# Patient Record
Sex: FEMALE | Race: WHITE
[De-identification: names, ages, dates, MRNs, and addresses within clinical notes are randomized per-mention and may not be internally consistent; named-entity substitution may affect disease eponyms.]

---

## 2023-05-28 ENCOUNTER — HOSPITAL ENCOUNTER (EMERGENCY)
Dept: HOSPITAL 75 - ER FS | Age: 38
Discharge: HOME | End: 2023-05-28
Payer: MEDICAID

## 2023-05-28 VITALS — BODY MASS INDEX: 24.5 KG/M2 | HEIGHT: 65 IN | WEIGHT: 147.05 LBS

## 2023-05-28 VITALS — SYSTOLIC BLOOD PRESSURE: 143 MMHG | DIASTOLIC BLOOD PRESSURE: 86 MMHG
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
## 2023-05-28 DIAGNOSIS — S51.852A: Primary | ICD-10-CM

## 2023-05-28 PROCEDURE — 99283 EMERGENCY DEPT VISIT LOW MDM: CPT

## 2023-05-28 NOTE — ED INTEGUMENTARY GENERAL
General


Chief Complaint:  Bite-Animal/Human/Insect


Stated Complaint:  ANIMAL BITE| HAND NUMB


Source:  patient


Exam Limitations:  no limitations





History of Present Illness


Date Seen by Provider:  May 28, 2023


Time Seen by Provider:  23:20


Initial Comments


37-year-old female presents for cat bite to her left arm.  She states 3 days ago

she was seen for cat bite on her left arm, had a tetanus updated since taking 

Augmentin.  Today she was bitten by a cat once again in the same spot.  She did 

not really worry about it because she was already on antibiotics and that she 

started to have a little numbness in her thumb and index finger and it hurts to 

move her left thumb.  The bite was in the mid forearm on the radial surface.  No

fevers or chills.  No drainage.





All other systems reviewed and negative except documented per HPI.





Voice recognition software was used to help create this chart





Allergies and Home Medications


Patient Home Medication List


Home Medication List Reviewed:  Yes





Review of Systems


Review of Systems


Constitutional:  see HPI





Past Medical-Social-Family Hx


Patient Social History


Tobacco Use?:  No


Use of E-Cig and/or Vaping dev:  No


Substance use?:  No


Alcohol Use?:  No





Physical Exam


Vital Signs


Capillary Refill :


General Appearance:  WD/WN, no apparent distress


Cardiovascular:  regular rate, rhythm, no murmur


Respiratory:  chest non-tender, lungs clear, normal breath sounds, no 

respiratory distress, no accessory muscle use


Gastrointestinal:  non tender, soft


Extremities:  non-tender, normal inspection, other (Several abrasions and 

puncture type marks to the flexor and lateral radial surface of the forearm.  

Neurovascular motor and sensory intact.  No induration, drainage.)


Skin:  normal color, warm/dry, other (Abrasions, punctures as described above)





Departure


Communication (Admissions)


Patient is hemodynamically stable, nontoxic.  She is here for second cat bite.  

She is already on Augmentin.  No indication for new antibiotics.  There is no 

evidence for infection.  She likely had a bite directly over near the tendon and

the radial nerve causing inflammation in the area or small damage to the area.  

Overall there is no weakness to to the affected area.  She does have mild 

sensory deficits thumb and index finger on the left side motor is intact.  She 

has full range of motion with minimal pain.  She is discharged in stable 

condition with supportive care and recommended to continue her antibiotics.





Impression





   Primary Impression:  


   Cat bite


   Qualified Codes:  W55.01XA - Bitten by cat, initial encounter


Disposition:  01 HOME, SELF-CARE


Condition:  Stable





Departure-Patient Inst.





Add. Discharge Instructions:  


Continue your antibiotics as previously prescribed.  Use ibuprofen alternated 

with Tylenol as needed for pain, inflammation.  Return to the emergency 

department immediately if you have any streaking up your arm or redness, 

drainage looks like pus or if your symptoms change in any way concerning to you.

 I believe the numbness in your fingers will get somewhat better but it is hard 

to tell if it all completely resolved.  The pain with movement in your thumb 

should get better over time.  Follow-up with your primary doctor for any 

nonemergent needs.





All discharge instructions reviewed with patient and/or family. Voiced 

understanding.











CHANI AUGUSTINE DO            May 28, 2023 23:29

## 2023-06-25 ENCOUNTER — HOSPITAL ENCOUNTER (EMERGENCY)
Dept: HOSPITAL 75 - ER FS | Age: 38
Discharge: HOME | End: 2023-06-25
Payer: MEDICAID

## 2023-06-25 VITALS — HEIGHT: 65 IN | WEIGHT: 148.59 LBS | BODY MASS INDEX: 24.76 KG/M2

## 2023-06-25 VITALS — SYSTOLIC BLOOD PRESSURE: 139 MMHG | DIASTOLIC BLOOD PRESSURE: 88 MMHG

## 2023-06-25 DIAGNOSIS — G44.209: ICD-10-CM

## 2023-06-25 DIAGNOSIS — M54.12: Primary | ICD-10-CM

## 2023-06-25 PROCEDURE — 99284 EMERGENCY DEPT VISIT MOD MDM: CPT

## 2023-06-25 NOTE — ED HEADACHE
General


Chief Complaint:  Head/Cervical Problems


Stated Complaint:  NECK PAIN - BILAT HAND NUMBNESS


Source:  patient


Exam Limitations:  no limitations





History of Present Illness


Date Seen by Provider:  Jun 25, 2023


Time Seen by Provider:  00:56


Initial Comments


37-year-old female with past medical history of depression, chronic neck and dallin

k pain, headaches coming in due to headache and neck pain.  Started about a week

and a half ago, went to [facility] emergency department was diagnosed 

with a pinched nerve in her neck.  She also had a headache and was given a 

migraine cocktail which improved her symptoms.  Symptoms started back up tonight

with the tightness in her neck with tension going across the back of her head to

the front.  The pain is moderate, constant, pressure-like.  With the pinched 

nerve, she sometimes gets numbness to her left arm past the elbow which she was 

experiencing a little bit earlier tonight.  Denies any weakness, falls, does not

take any blood thinners, no vision changes, chest pain, or any other concerns.





Allergies and Home Medications


Allergies


Coded Allergies:  


     No Known Drug Allergies (Unverified , 6/25/23)





Patient Home Medication List


Home Medication List Reviewed:  Yes





Review of Systems


Review of Systems


Constitutional:  No fever


Eyes:  No Symptoms Reported


Ears, Nose, Mouth, Throat:  no symptoms reported


Respiratory:  no symptoms reported


Cardiovascular:  no symptoms reported


Gastrointestinal:  no symptoms reported


Genitourinary:  no symptoms reported


Musculoskeletal:  see HPI


Psychiatric/Neurological:  See HPI





Past Medical-Social-Family Hx


Patient Social History


Substance use?:  Yes


Substance type:  Marijuana





Immunizations Up To Date


First/Initial COVID19 Vaccinat:  2021


Second COVID19 Vaccination Martin:  2021


Third COVID19 Vaccination Date:  2022





Physical Exam


Vital Signs


Capillary Refill :


Height, Weight, BMI


Height: '"


Weight: lbs. oz. kg; 24.00 BMI


Method:


General Appearance:  WD/WN, no apparent distress


HEENT:  PERRL/EOMI, normal ENT inspection, pharynx normal


Neck:  non-tender, full range of motion, supple, normal inspection, other 

(Positive Spurling on the left with recreation of her symptoms, tingling in the 

distribution of C6 and C7)


Cardiovascular:  regular rate, rhythm


Respiratory:  chest non-tender, lungs clear, normal breath sounds, no 

respiratory distress, no accessory muscle use


Gastrointestinal:  normal bowel sounds, non tender, soft


Back:  normal inspection, no CVA tenderness, no vertebral tenderness


Extremities:  normal range of motion, non-tender, normal inspection, no pedal 

edema, no calf tenderness, normal capillary refill


Psychiatric:  alert


Crainal Nerves:  normal hearing, normal speech, PERRL


Coordination/Gait:  normal finger to nose, normal gait


Motor/Sensory:  no motor deficit, no sensory deficit


Skin:  normal color, warm/dry





Progress/Results/Core Measures


Results/Orders


My Orders





Orders - JULIANN LAMBERT MD


Ketorolac Injection (Toradol Injection) (6/25/23 01:30)


Prochlorperazine Injection (Compazine In (6/25/23 01:30)


Diphenhydramine Injection (Benadryl Inje (6/25/23 01:30)


Dexamethasone Oral Soln (Ed) (Decadron I (6/25/23 01:19)








Progress


Progress Note :  


Progress Note


37-year-old female with above history coming in due to headache and cervical 

radiculopathy.  ABCs were intact and vitals were stable on presentation.  

Physical exam with a positive Spurling and tingling in the C6 and C7 

distribution of her neck.  I suspect a tension type headache as well as cervical

radiculopathy clinically.  She has no red flags on exam.  She will be given IM 

medications for her headache as well as Decadron for the headache and cervical 

radiculopathy.  In the absence of trauma and the fact that this is going on a 

chronic issue,, I think it is appropriate for her to have outpatient imaging 

with a spine surgeon as a consultant, especially given she has no true numbness 

or weakness on exam objectively.  Overall well-appearing and I believe stable 

for discharge with outpatient follow-up.  She was sent home with strict return 

precautions.





Departure


Impression





   Primary Impression:  


   Cervical radiculopathy


   Additional Impression:  


   Tension headache


Disposition:  01 HOME, SELF-CARE


Condition:  Stable





Departure-Patient Inst.


Decision time for Depature:  01:35


Referrals:  


NO,LOCAL PHYSICIAN (PCP/Family)


Primary Care Physician


Patient Instructions:  Headache, Adult ED, Radiculopathy (DC)





Add. Discharge Instructions:  


The numbness in your arm could be from 2 different things.  We sometimes see 

this with migraines, and it typically gets better when the headache gets better.

 I think what is more likely is a pinched nerve in your cervical spine causing 

numbness down your arm.  This is typically more common when the numbness goes 

past the elbow.  The medications we gave you today would help with inflammation 

and could potentially help with this.  We do recommend if this persists to 

follow-up with your regular doctor and likely get a referral to a spine 

physician for an evaluation.  If having pain tomorrow, we recommend 600 mg of 

ibuprofen every 6 hours as well as 1000 mg of Tylenol every 6 hours for the 

pain.


Work/School Note:  Work Release Form   Date Seen in the Emergency Department:  

Jun 25, 2023


   Return to Work:  Jun 26, 2023


   Restrictions:  No Restrictions











JULIANN LAMBERT MD          Jun 25, 2023 01:19

## 2023-07-13 ENCOUNTER — HOSPITAL ENCOUNTER (EMERGENCY)
Dept: HOSPITAL 75 - ER FS | Age: 38
LOS: 1 days | Discharge: HOME | End: 2023-07-14
Payer: MEDICAID

## 2023-07-13 DIAGNOSIS — R23.2: Primary | ICD-10-CM

## 2023-07-13 DIAGNOSIS — T50.995A: ICD-10-CM

## 2023-07-13 DIAGNOSIS — T40.495A: ICD-10-CM

## 2023-07-13 DIAGNOSIS — T50.7X5A: ICD-10-CM

## 2023-07-13 DIAGNOSIS — T43.225A: ICD-10-CM

## 2023-07-13 DIAGNOSIS — T48.1X5A: ICD-10-CM

## 2023-07-13 DIAGNOSIS — L53.9: ICD-10-CM

## 2023-07-13 PROCEDURE — 99283 EMERGENCY DEPT VISIT LOW MDM: CPT

## 2023-07-13 NOTE — ED INTEGUMENTARY GENERAL
General


Stated Complaint:  FACIAL/CHEST RASH





History of Present Illness


Date Seen by Provider:  Jul 13, 2023


Time Seen by Provider:  23:55


Initial Comments


37 yr F with PMH of Depression, is here with c/o flushing and redness to her 

face and chest, and also to her hands which has been worsening over the past 

couple of days, with restlessness, anxiety, night sweats, hot flashes.  Patient 

started on Viibryd one moth ago. Pt is also on Subaxone, and was recently 

started on Flexeril 3 days ago, and that is when the pt's symptoms began. Denies

seizures, muscular rigidity or stiffness, confusion, fever and chills, diarrhea,

vomiting.





Allergies and Home Medications


Allergies


Coded Allergies:  


     No Known Drug Allergies (Unverified , 6/25/23)





Patient Home Medication List


Home Medication List Reviewed:  Yes


Buprenorphine HCl/Naloxone HCl (Suboxone 8 mg-2 mg Sl Film) 8 Mg-2 Mg Film, 0.5 

FILM SL DAILY, (Reported)


   Entered as Reported by: PEREZ FAIRBANKS on 6/25/23 0213


Diphenhydramine HCl (Benadryl) 25 Mg Capsule, 25 MG PO BID


   Prescribed by: FARZANEH MOON MD on 7/14/23 0046


Epinephrine (Epinephrine) 0.3 Mg/0.3 Ml Auto.injct, 0.3 MG IJ ONCE


   Prescribed by: FARZANEH MOON MD on 7/14/23 0046


Prednisone (Prednisone) 50 Mg Tab, 50 MG PO DAILY


   Prescribed by: FARZANEH MOON MD on 7/14/23 0046





Review of Systems


Review of Systems


Constitutional:  no symptoms reported, see HPI


EENTM:  no symptoms reported


Respiratory:  no symptoms reported


Cardiovascular:  see HPI


Gastrointestinal:  no symptoms reported


Genitourinary:  no symptoms reported


Musculoskeletal:  no symptoms reported


Skin:  see HPI, change in color


Psychiatric/Neurological:  Anxiety


Endocrine:  No Symptoms Reported





Past Medical-Social-Family Hx


Immunizations Up To Date


First/Initial COVID19 Vaccinat:  2021


Second COVID19 Vaccination Martin:  2021


Third COVID19 Vaccination Date:  2022





Past Medical History


Surgery/Hospitalization HX:  


GERD, Depression





Physical Exam


Vital Signs





Vital Signs - First Documented








 7/13/23





 23:57


 


Temp 36.9


 


Pulse 114


 


Resp 18


 


B/P (MAP) 180/110 (133)


 


Pulse Ox 100


 


O2 Delivery Room Air





Capillary Refill :


General Appearance:  WD/WN, mild distress


HEENT:  PERRL/EOMI, normal ENT inspection, pharynx normal


Neck:  non-tender, full range of motion, supple, normal inspection


Cardiovascular:  no edema, tachycardia


Respiratory:  chest non-tender, lungs clear, normal breath sounds, no 

respiratory distress, no accessory muscle use


Gastrointestinal:  non tender, soft


Extremities:  normal range of motion


Neurologic/Psychiatric:  no motor/sensory deficits, alert, normal mood/affect, 

oriented x 3


Skin:  other (Flushing in the face and upper chest and upper extremities)


Skin Problem Location:  face, neck, upper extremities


Skin Problem Character:  other (Flushing)





Progress/Results/Core Measures


Results/Orders


My Orders





Orders - FARZANEH MOON MD


Prednisone Tablet (Deltasone Tablet) (7/14/23 00:15)


Diphenhydramine Tablet (Benadryl Tablet) (7/14/23 00:15)


Famotidine Tablet (Pepcid Tablet) (7/14/23 00:15)


Lorazepam Tablet (Ativan Tablet) (7/14/23 00:14)


Lorazepam Tablet (Ativan Tablet) (7/14/23 00:28)


Lorazepam Tablet (Ativan Tablet) (7/14/23 00:32)





Medications Given in ED





Current Medications








 Medications  Dose


 Ordered  Sig/Veronica


 Route  Start Time


 Stop Time Status Last Admin


Dose Admin


 


 Diphenhydramine


 HCl  50 mg  ONCE  ONCE


 PO  7/14/23 00:15


 7/14/23 00:16 DC 7/14/23 00:17


50 MG


 


 Famotidine  20 mg  ONCE  ONCE


 PO  7/14/23 00:15


 7/14/23 00:16 DC 7/14/23 00:17


20 MG


 


 Prednisone  50 mg  ONCE  ONCE


 PO  7/14/23 00:15


 7/14/23 00:16 DC 7/14/23 00:17


50 MG








Vital Signs/I&O











 7/13/23 7/14/23





 23:57 00:43


 


Temp 36.9 36.9


 


Pulse 114 114


 


Resp 18 18


 


B/P (MAP) 180/110 (133) 180/110


 


Pulse Ox 100 100


 


O2 Delivery Room Air Room Air











Progress


Progress Note :  


Progress Note


1. ADVERSE REACTION WITH INTERACTION OF VIIBRYD AND FLEXERIL AND SUBOXONE: MILD 

SEROTONIN SYNDROME


-There is a possibility that patient may be developing mild serotonin syndrome 

caused by the Viibryd along with Flexeril.  Symptoms include flushing, anxiety, 

restlessness, tachycardia and elevated blood pressure. Pt was given Prednisone 

50mg, Benadryl 50mg, and Pepcid 20mg, and Ativan 0.5mg in the ER. Advised to 

stop viibryd and suboxone and Flexeril


- Prescriptions for prednisone for 3 days 50mg daily, Benadryl 25mg twice a day 

for 3 days, Epi pen dual pack prescription to be taken only if SOB and wheezing 

develop


- Follow up with Psychiatry ASAP. Call Psych office tomorrow to report 

interaction of Viibryd and Flexril and adverse reaction. 


- Follow up with PCP within the next 3 days


- Return to ER if symptoms worsen


 - Will take approximately 48 to 72 hours to resolve.


- Hydration with 8 to 10 glasses of water a day recommended


-The patient was seen in the ED, and treated appropriately to presentation at a 

specific point in time. Patient is informed that there is a possibility that 

disease and illness can evolve and change in acuity rapidly or slowly after 

patient is discharged from the ER. Precautionary advice given to the patient for

immediate return to ER if symptoms worsen or do not resolve, and to seek 

emergency care sooner rather than later. Pt also advised on the importance of 

PCP follow up and compliance with management and follow up plan with PCP and/or 

specialist, as this is part of the management plan. Pt verbally expressed 

understanding.





Departure


Impression





   Primary Impression:  


   Side effect of drug


   Additional Impression:  


   Serotonin syndrome


Disposition:  01 HOME, SELF-CARE


Condition:  Stable





Departure-Patient Inst.


Referrals:  


NO,LOCAL PHYSICIAN (PCP/Family)


Primary Care Physician


Patient Instructions:  Adverse Drug Reactions, Adult (DC), Adverse Drug 

Reactions, Adult ED, Serotonin syndrome





Add. Discharge Instructions:  


- Prescriptions for prednisone for 3 days 50mg daily, Benadryl 25mg twice a day 

for 3 days, Epi pen dual pack prescription to be taken only if SOB and wheezing 

develop


- Follow up with Psychiatry ASAP. Call Psych office tomorrow to report 

interaction of Viibryd and Flexril and Suboxone adverse reaction. Stop taking 

these medications. Will take approximately 48 to 72 hours to resolve.


- Follow up with PCP within the next 3 days


- Return to ER if symptoms worsen


- Hydration with 8 to 10 glasses of water a day recommended


Scripts


Diphenhydramine HCl (Benadryl) 25 Mg Capsule


25 MG PO BID for 3 Days, #6 CAP


   Prov: FARZANEH MOON MD         7/14/23 


Prednisone (Prednisone) 50 Mg Tab


50 MG PO DAILY for 3 Days, #3 TAB


   Prov: FARZANEH MOON MD         7/14/23 


Epinephrine (Epinephrine) 0.3 Mg/0.3 Ml Auto.injct


0.3 MG IJ ONCE for Shortness of Breath for 1 Day, #2 ML


   Prov: FARZANEH MOON MD         7/14/23











FARZANEH MOON MD              Jul 13, 2023 23:55

## 2023-07-14 VITALS — DIASTOLIC BLOOD PRESSURE: 110 MMHG | SYSTOLIC BLOOD PRESSURE: 180 MMHG

## 2023-07-18 ENCOUNTER — HOSPITAL ENCOUNTER (EMERGENCY)
Dept: HOSPITAL 75 - ER FS | Age: 38
Discharge: HOME | End: 2023-07-18
Payer: MEDICAID

## 2023-07-18 VITALS — WEIGHT: 147.93 LBS | HEIGHT: 65 IN | BODY MASS INDEX: 24.65 KG/M2

## 2023-07-18 VITALS — DIASTOLIC BLOOD PRESSURE: 76 MMHG | SYSTOLIC BLOOD PRESSURE: 131 MMHG

## 2023-07-18 DIAGNOSIS — R23.2: ICD-10-CM

## 2023-07-18 DIAGNOSIS — R00.0: ICD-10-CM

## 2023-07-18 DIAGNOSIS — R00.2: Primary | ICD-10-CM

## 2023-07-18 LAB
BASOPHILS # BLD AUTO: 0.1 10^3/UL (ref 0–0.1)
BASOPHILS NFR BLD AUTO: 1 % (ref 0–10)
BUN/CREAT SERPL: 14
CALCIUM SERPL-MCNC: 9.4 MG/DL (ref 8.5–10.1)
CHLORIDE SERPL-SCNC: 103 MMOL/L (ref 98–107)
CO2 SERPL-SCNC: 29 MMOL/L (ref 21–32)
CREAT SERPL-MCNC: 0.63 MG/DL (ref 0.6–1.3)
EOSINOPHIL # BLD AUTO: 0.1 10^3/UL (ref 0–0.3)
EOSINOPHIL NFR BLD AUTO: 1 % (ref 0–10)
GFR SERPLBLD BASED ON 1.73 SQ M-ARVRAT: 117 ML/MIN
GLUCOSE SERPL-MCNC: 117 MG/DL (ref 70–105)
HCT VFR BLD CALC: 41 % (ref 35–52)
HGB BLD-MCNC: 13.9 G/DL (ref 11.5–16)
LYMPHOCYTES # BLD AUTO: 3.9 10^3/UL (ref 1–4)
LYMPHOCYTES NFR BLD AUTO: 44 % (ref 12–44)
MANUAL DIFFERENTIAL PERFORMED BLD QL: NO
MCH RBC QN AUTO: 31 PG (ref 25–34)
MCHC RBC AUTO-ENTMCNC: 34 G/DL (ref 32–36)
MCV RBC AUTO: 91 FL (ref 80–99)
MONOCYTES # BLD AUTO: 0.4 10^3/UL (ref 0–1)
MONOCYTES NFR BLD AUTO: 5 % (ref 0–12)
NEUTROPHILS # BLD AUTO: 4.2 10^3/UL (ref 1.8–7.8)
NEUTROPHILS NFR BLD AUTO: 48 % (ref 42–75)
PLATELET # BLD: 282 10^3/UL (ref 130–400)
PMV BLD AUTO: 9.6 FL (ref 9–12.2)
POTASSIUM SERPL-SCNC: 3.4 MMOL/L (ref 3.6–5)
SODIUM SERPL-SCNC: 143 MMOL/L (ref 135–145)
WBC # BLD AUTO: 8.8 10^3/UL (ref 4.3–11)

## 2023-07-18 PROCEDURE — 85025 COMPLETE CBC W/AUTO DIFF WBC: CPT

## 2023-07-18 PROCEDURE — 36415 COLL VENOUS BLD VENIPUNCTURE: CPT

## 2023-07-18 PROCEDURE — 80048 BASIC METABOLIC PNL TOTAL CA: CPT

## 2023-07-18 PROCEDURE — 84703 CHORIONIC GONADOTROPIN ASSAY: CPT

## 2023-07-18 PROCEDURE — 71045 X-RAY EXAM CHEST 1 VIEW: CPT

## 2023-07-18 PROCEDURE — 93005 ELECTROCARDIOGRAM TRACING: CPT

## 2023-07-18 NOTE — ED GENERAL
General


Stated Complaint:  ABD PAIN,HEART RACING,NAUSEA


Source of Information:  Patient


Exam Limitations:  No Limitations





History of Present Illness


Date Seen by Provider:  Jul 18, 2023


Time Seen by Provider:  16:14


Initial Comments


37-year-old female presents to the emergency department today for palpitations 

nausea and an overall feeling of "burning from the inside."  She states symptoms

started on 7/10.  She was seen here on 7/13.  At that time it was thought she 

may have a mild serotonin syndrome according to record review if she was taking 

Viibryd Flexeril and Suboxone.  She was advised to stop taking Viibryd and 

Flexeril and continue Suboxone.  Her symptoms have persisted.  She states she 

has had intermittent episodes of heart racing that lasts a few minutes to up to 

30 minutes at a time.  These are associated with some lightheadedness.  She 

states she checked her pulse and it would go as high as 158.  She has had some 

intermittent chest tightness during these episodes but has none outside of that.

 Her symptoms do not seem to be specifically associated with exertion.  She has 

had some nausea but has not vomited.  She has had diffuse abdominal cramping 

during these episodes as well.  She also states she has had some facial flushing

intermittently.  She was seen by her psychiatrist and giving something that was 

"like Benadryl."  She states sometimes she will take this and can get her heart 

rate down but sometimes it does not work.





All other systems reviewed and negative except documented per HPI.





Voice recognition software was used to help create this chart





Allergies and Home Medications


Allergies


Coded Allergies:  


     No Known Drug Allergies (Unverified , 6/25/23)





Patient Home Medication List


Home Medication List Reviewed:  Yes


Buprenorphine HCl/Naloxone HCl (Suboxone 8 mg-2 mg Sl Film) 8 Mg-2 Mg Film, 0.5 

FILM SL DAILY, (Reported)


   Entered as Reported by: PEREZ FAIRBANKS on 6/25/23 0213


Diphenhydramine HCl (Benadryl) 25 Mg Capsule, 25 MG PO BID


   Prescribed by: FARZANEH MOON MD on 7/14/23 0046


Epinephrine (Epinephrine) 0.3 Mg/0.3 Ml Auto.injct, 0.3 MG IJ ONCE


   Prescribed by: FARZANEH MOON MD on 7/14/23 0046


Prednisone (Prednisone) 50 Mg Tab, 50 MG PO DAILY


   Prescribed by: FARZANEH MOON MD on 7/14/23 0046





Review of Systems


Review of Systems


Constitutional:  see HPI





Past Medical-Social-Family Hx


Patient Social History


Tobacco Use?:  No


Use of E-Cig and/or Vaping dev:  No


Substance use?:  No


Alcohol Use?:  No





Immunizations Up To Date


First/Initial COVID19 Vaccinat:  2021


Second COVID19 Vaccination Martin:  2021


Third COVID19 Vaccination Date:  2022





Past Medical History


Surgery/Hospitalization HX:  


GERD, Depression





Physical Exam


Vital Signs





Vital Signs - First Documented








 7/18/23





 16:08


 


Temp 36.7


 


Pulse 102


 


Resp 18


 


B/P (MAP) 150/94 (112)


 


O2 Delivery Room Air





Capillary Refill :


Height, Weight, BMI


Height: '"


Weight: lbs. oz. kg; 24.00 BMI


Method:


General Appearance:  No Apparent Distress, WD/WN


Eyes:  Bilateral Eye Normal Inspection, Bilateral Eye PERRL, Bilateral Eye EOMI


HEENT:  PERRL/EOMI, Normal ENT Inspection, Pharynx Normal


Neck:  Full Range of Motion, Non Tender, Supple


Respiratory:  Chest Non Tender, Lungs Clear, Normal Breath Sounds, No Accessory 

Muscle Use, No Respiratory Distress


Cardiovascular:  No Murmur, Tachycardia


Gastrointestinal:  Normal Bowel Sounds, No Organomegaly, Non Tender, Soft


Extremity:  Normal Capillary Refill, Normal Inspection, Normal Range of Motion, 

Non Tender, No Calf Tenderness, No Pedal Edema


Neurologic/Psychiatric:  Alert, Oriented x3, No Motor/Sensory Deficits


Skin:  Other (mild facial flushing)





Progress/Results/Core Measures


Suspected Sepsis


SIRS


Temperature: 


Pulse:  


Respiratory Rate: 


 


Laboratory Tests


7/18/23 16:23: White Blood Count 8.8


Blood Pressure  / 


Mean: 


 





Laboratory Tests


7/18/23 16:23: 


Creatinine 0.63, Platelet Count 282








Results/Orders


Lab Results





Laboratory Tests








Test


 7/18/23


16:23 Range/Units


 


 


White Blood Count


 8.8 


 4.3-11.0


10^3/uL


 


Red Blood Count


 4.50 


 3.80-5.11


10^6/uL


 


Hemoglobin 13.9  11.5-16.0  g/dL


 


Hematocrit 41  35-52  %


 


Mean Corpuscular Volume 91  80-99  fL


 


Mean Corpuscular Hemoglobin 31  25-34  pg


 


Mean Corpuscular Hemoglobin


Concent 34 


 32-36  g/dL





 


Red Cell Distribution Width 12.4  10.0-14.5  %


 


Platelet Count


 282 


 130-400


10^3/uL


 


Mean Platelet Volume 9.6  9.0-12.2  fL


 


Immature Granulocyte % (Auto) 1   %


 


Neutrophils (%) (Auto) 48  42-75  %


 


Lymphocytes (%) (Auto) 44  12-44  %


 


Monocytes (%) (Auto) 5  0-12  %


 


Eosinophils (%) (Auto) 1  0-10  %


 


Basophils (%) (Auto) 1  0-10  %


 


Neutrophils # (Auto)


 4.2 


 1.8-7.8


10^3/uL


 


Lymphocytes # (Auto)


 3.9 


 1.0-4.0


10^3/uL


 


Monocytes # (Auto)


 0.4 


 0.0-1.0


10^3/uL


 


Eosinophils # (Auto)


 0.1 


 0.0-0.3


10^3/uL


 


Basophils # (Auto)


 0.1 


 0.0-0.1


10^3/uL


 


Immature Granulocyte # (Auto)


 0.1 


 0.0-0.1


10^3/uL


 


Sodium Level 143  135-145  MMOL/L


 


Potassium Level 3.4 L 3.6-5.0  MMOL/L


 


Chloride Level 103    MMOL/L


 


Carbon Dioxide Level 29  21-32  MMOL/L


 


Anion Gap 11  5-14  MMOL/L


 


Blood Urea Nitrogen 9  7-18  MG/DL


 


Creatinine


 0.63 


 0.60-1.30


MG/DL


 


Estimat Glomerular Filtration


Rate 117 


  





 


BUN/Creatinine Ratio 14   


 


Glucose Level 117 H   MG/DL


 


Calcium Level 9.4  8.5-10.1  MG/DL


 


Serum Pregnancy Test,


Qualitative NEGATIVE 


 NEGATIVE  











My Orders





Orders - DAVIDECHANI RODRIGUEZ DO


Cbc With Automated Diff (7/18/23 16:19)


Basic Metabolic Panel (7/18/23 16:19)


Hcg,Qualitative Serum (7/18/23 16:19)


Chest 1 View Ap/Pa Only (7/18/23 16:19)


Ekg Tracing (7/18/23 16:19)





Vital Signs/I&O











 7/18/23





 16:08


 


Temp 36.7


 


Pulse 102


 


Resp 18


 


B/P (MAP) 150/94 (112)


 


O2 Delivery Room Air





Capillary Refill :





ECG


Comment


Sinus rhythm at 97 bpm.  Normal intervals.  Normal axis.  No ST or T wave 

abnormalities.  No ectopy.  No STEMI.





Departure


Communication (Admissions)


Independently reviewed EKG which is nonischemic, sinus tachycardia.  When about 

the room her heart rate is in the 70s to 80s.  When I entered the room it enters

into the 90s to low 100s.  Blood pressures have been stable.  I reviewed her 

chest x-ray which was negative for any acute cardiopulmonary abnormalities.  

Chemistry obtained to check for electrolyte abnormality, this is normal.  CBC is

obtained to check for anemia, this is normal as well.  There is no evidence for 

any emergency medical condition at this time.  Her troponin is negative.  She be

discharged home with supportive care.  It sounds as though her symptoms may be 

related to anxiety.  She states she will call her psychiatrist tomorrow for 

further treatment recommendations.  She is discharged home in stable condition.





Impression





   Primary Impression:  


   Palpitations


Disposition:  01 HOME, SELF-CARE


Condition:  Stable





Departure-Patient Inst.


Referrals:  


NO,LOCAL PHYSICIAN (PCP/Family)


Primary Care Physician


Patient Instructions:  Palpitations (DC)





Add. Discharge Instructions:  


No emergent medical conditions are identified for your symptoms today.  Your 

heart rate is slightly elevated on arrival but is a sinus rhythm meaning that 

the pictures that the electricity is coming from the right place.  I recommend 

you follow-up by getting a Holter monitor.  Unfortunately this is not something 

we can arrange from the emergency department but you can call your primary care 

doctor's office tomorrow to help with arrangements.  Drink plenty of fluids and 

get rest as needed.  Return to the emergency department for any severe concerns.











CHANI AUGUSTINE DO            Jul 18, 2023 16:26

## 2023-07-18 NOTE — DIAGNOSTIC IMAGING REPORT
Indication:  Chest pain and palpitations. 



No priors.



Findings:  Heart and lungs appear normal. No pleural pathology. 



Impression:  Negative



Dictated by: 



  Dictated on workstation # [id number]

## 2023-08-29 ENCOUNTER — HOSPITAL ENCOUNTER (EMERGENCY)
Dept: HOSPITAL 75 - ER FS | Age: 38
Discharge: HOME | End: 2023-08-29
Payer: MEDICAID

## 2023-08-29 VITALS — SYSTOLIC BLOOD PRESSURE: 124 MMHG | DIASTOLIC BLOOD PRESSURE: 72 MMHG

## 2023-08-29 VITALS — HEIGHT: 65 IN | WEIGHT: 147.93 LBS | BODY MASS INDEX: 24.65 KG/M2

## 2023-08-29 DIAGNOSIS — Z79.891: ICD-10-CM

## 2023-08-29 DIAGNOSIS — R00.0: ICD-10-CM

## 2023-08-29 DIAGNOSIS — E87.6: ICD-10-CM

## 2023-08-29 DIAGNOSIS — R00.2: Primary | ICD-10-CM

## 2023-08-29 LAB
BASOPHILS # BLD AUTO: 0 10^3/UL (ref 0–0.1)
BASOPHILS NFR BLD AUTO: 1 % (ref 0–10)
BUN/CREAT SERPL: 13
CALCIUM SERPL-MCNC: 10.3 MG/DL (ref 8.5–10.1)
CHLORIDE SERPL-SCNC: 104 MMOL/L (ref 98–107)
CO2 SERPL-SCNC: 25 MMOL/L (ref 21–32)
CREAT SERPL-MCNC: 0.67 MG/DL (ref 0.6–1.3)
EOSINOPHIL # BLD AUTO: 0 10^3/UL (ref 0–0.3)
EOSINOPHIL NFR BLD AUTO: 1 % (ref 0–10)
GFR SERPLBLD BASED ON 1.73 SQ M-ARVRAT: 115 ML/MIN
GLUCOSE SERPL-MCNC: 107 MG/DL (ref 70–105)
HCT VFR BLD CALC: 42 % (ref 35–52)
HGB BLD-MCNC: 14.4 G/DL (ref 11.5–16)
LYMPHOCYTES # BLD AUTO: 1.9 10^3/UL (ref 1–4)
LYMPHOCYTES NFR BLD AUTO: 29 % (ref 12–44)
MANUAL DIFFERENTIAL PERFORMED BLD QL: NO
MCH RBC QN AUTO: 31 PG (ref 25–34)
MCHC RBC AUTO-ENTMCNC: 34 G/DL (ref 32–36)
MCV RBC AUTO: 92 FL (ref 80–99)
MONOCYTES # BLD AUTO: 0.2 10^3/UL (ref 0–1)
MONOCYTES NFR BLD AUTO: 4 % (ref 0–12)
NEUTROPHILS # BLD AUTO: 4.3 10^3/UL (ref 1.8–7.8)
NEUTROPHILS NFR BLD AUTO: 65 % (ref 42–75)
PLATELET # BLD: 291 10^3/UL (ref 130–400)
PMV BLD AUTO: 9.8 FL (ref 9–12.2)
POTASSIUM SERPL-SCNC: 3.5 MMOL/L (ref 3.6–5)
SODIUM SERPL-SCNC: 138 MMOL/L (ref 135–145)
WBC # BLD AUTO: 6.6 10^3/UL (ref 4.3–11)

## 2023-08-29 PROCEDURE — 85025 COMPLETE CBC W/AUTO DIFF WBC: CPT

## 2023-08-29 PROCEDURE — 80048 BASIC METABOLIC PNL TOTAL CA: CPT

## 2023-08-29 PROCEDURE — 93005 ELECTROCARDIOGRAM TRACING: CPT

## 2023-08-29 PROCEDURE — 36415 COLL VENOUS BLD VENIPUNCTURE: CPT

## 2023-08-29 NOTE — ED CARDIAC GENERAL
History of Present Illness


General


Chief Complaint:  Cardiac/General Problems


Stated Complaint:  LETHARGY; ELEV HR; HEART PALPITATIONS


Source:  patient


Exam Limitations:  no limitations





History of Present Illness


Date Seen by Provider:  Aug 29, 2023


Time Seen by Provider:  13:33


Initial Comments


37-year-old female presents emergency department today for elevated heart rate 

and fatigue.  Symptoms present over the last couple of days.  No fevers chills 

cough abdominal pain, change in bowel or bladder habits.  Normal menstrual cycle

a few weeks.  No sick contacts.  She did take some hydroxyzine which she takes 

as needed for anxiety prior to arrival.  She also takes Suboxone daily and has 

done so for several years.





All other systems reviewed and negative except documented per HPI.





Voice recognition software was used to help create this chart





Allergies and Home Medications


Allergies


Coded Allergies:  


     No Known Drug Allergies (Unverified , 6/25/23)





Patient Home Medication List


Home Medication List Reviewed:  Yes


Buprenorphine HCl/Naloxone HCl (Suboxone 8 mg-2 mg Sl Film) 8 Mg-2 Mg Film, 0.5 

FILM SL DAILY, (Reported)


   Entered as Reported by: PEREZ FAIRBANKS on 6/25/23 0213


Diphenhydramine HCl (Benadryl) 25 Mg Capsule, 25 MG PO BID


   Prescribed by: FARZANEH MOON MD on 7/14/23 0046


Epinephrine (Epinephrine) 0.3 Mg/0.3 Ml Auto.injct, 0.3 MG IJ ONCE


   Prescribed by: FARZANEH MOON MD on 7/14/23 0046


Prednisone (Prednisone) 50 Mg Tab, 50 MG PO DAILY


   Prescribed by: FARZANEH MOON MD on 7/14/23 0046





Review of Systems


Review of Systems


Constitutional:  see HPI





Past Medical-Social-Family Hx


Patient Social History


Tobacco Use?:  No


Use of E-Cig and/or Vaping dev:  No


Substance use?:  No


Alcohol Use?:  No





Immunizations Up To Date


First/Initial COVID19 Vaccinat:  2021


Second COVID19 Vaccination Martin:  2021


Third COVID19 Vaccination Date:  2022





Past Medical History


Surgery/Hospitalization HX:  


GERD, Depression





Physical Exam


Vital Signs





Vital Signs - First Documented








 8/29/23





 13:32


 


Temp 37.0


 


Pulse 109


 


Resp 17


 


B/P (MAP) 153/99 (117)


 


Pulse Ox 99


 


O2 Delivery Room Air





Capillary Refill :


Height, Weight, BMI


Height: '"


Weight: lbs. oz. kg; 24.00 BMI


Method:


General Appearance:  No Apparent Distress, WD/WN


HEENT:  Normal ENT Inspection, Pharynx Normal


Neck:  Full Range of Motion, Supple


Respiratory:  Chest Non Tender, Lungs Clear, Normal Breath Sounds, No Accessory 

Muscle Use, No Respiratory Distress


Cardiovascular:  No Murmur, Tachycardia (The patient has tachycardia between 

[id number].  Notably when evaluating on the bedside monitor this is mostly when 

someone is in the room.  Upon leaving the room her heart rate drops into the 

90s.)


Gastrointestinal:  Normal Bowel Sounds, No Organomegaly, Non Tender, Soft


Extremity:  Normal Capillary Refill, Normal Inspection, Non Tender, No Calf 

Tenderness


Neurologic/Psychiatric:  Alert, Oriented x3, Normal Mood/Affect


Skin:  Normal Color, Warm/Dry





Progress/Results/Core Measures


Results/Orders


Lab Results





Laboratory Tests








Test


 8/29/23


13:53 Range/Units


 


 


White Blood Count


 6.6 


 4.3-11.0


10^3/uL


 


Red Blood Count


 4.59 


 3.80-5.11


10^6/uL


 


Hemoglobin 14.4  11.5-16.0  g/dL


 


Hematocrit 42  35-52  %


 


Mean Corpuscular Volume 92  80-99  fL


 


Mean Corpuscular Hemoglobin 31  25-34  pg


 


Mean Corpuscular Hemoglobin


Concent 34 


 32-36  g/dL





 


Red Cell Distribution Width 12.3  10.0-14.5  %


 


Platelet Count


 291 


 130-400


10^3/uL


 


Mean Platelet Volume 9.8  9.0-12.2  fL


 


Immature Granulocyte % (Auto) 1   %


 


Neutrophils (%) (Auto) 65  42-75  %


 


Lymphocytes (%) (Auto) 29  12-44  %


 


Monocytes (%) (Auto) 4  0-12  %


 


Eosinophils (%) (Auto) 1  0-10  %


 


Basophils (%) (Auto) 1  0-10  %


 


Neutrophils # (Auto)


 4.3 


 1.8-7.8


10^3/uL


 


Lymphocytes # (Auto)


 1.9 


 1.0-4.0


10^3/uL


 


Monocytes # (Auto)


 0.2 


 0.0-1.0


10^3/uL


 


Eosinophils # (Auto)


 0.0 


 0.0-0.3


10^3/uL


 


Basophils # (Auto)


 0.0 


 0.0-0.1


10^3/uL


 


Immature Granulocyte # (Auto)


 0.0 


 0.0-0.1


10^3/uL


 


Percent Immature Platelet


Fraction 2.8 


 0.0-7.6  %





 


Sodium Level 138  135-145  MMOL/L


 


Potassium Level 3.5 L 3.6-5.0  MMOL/L


 


Chloride Level 104    MMOL/L


 


Carbon Dioxide Level 25  21-32  MMOL/L


 


Anion Gap 9  5-14  MMOL/L


 


Blood Urea Nitrogen 9  7-18  MG/DL


 


Creatinine


 0.67 


 0.60-1.30


MG/DL


 


Estimat Glomerular Filtration


Rate 115 


  





 


BUN/Creatinine Ratio 13   


 


Glucose Level 107 H   MG/DL


 


Calcium Level 10.3 H 8.5-10.1  MG/DL








My Orders





Orders - CHANI AUGUSTINE DO


Cbc With Automated Diff (8/29/23 13:38)


Basic Metabolic Panel (8/29/23 13:38)


Ekg Tracing (8/29/23 13:39)





Vital Signs/I&O











 8/29/23





 13:32


 


Temp 37.0


 


Pulse 109


 


Resp 17


 


B/P (MAP) 153/99 (117)


 


Pulse Ox 99


 


O2 Delivery Room Air








Comment


Sinus rhythm with a rate of 73 bpm.  Normal intervals.  Normal axis.  No ST or T

wave abnormalities.  No ectopy.  No STEMI.





Departure


Communication (Admissions)


Her blood pressurePatient is hemodynamically stable.  She is tachycardic and we 

are in the room however the minute caregivers leave the room her heart rate gr

adually settles down into the 70s80s.  T her blood pressures are stable.  She 

has no chest pain.  No dizziness, lightheadedness.  Lab work-up is remarkable 

only for very mild hypokalemia, 3.5..  No evidence for anemia.  She be 

discharged in stable condition with supportive care.





Impression





   Primary Impression:  


   Palpitations


Disposition:  01 HOME, SELF-CARE


Condition:  Stable





Departure-Patient Inst.


Referrals:  


NO,LOCAL PHYSICIAN (PCP/Family)


Primary Care Physician


Patient Instructions:  Palpitations





Add. Discharge Instructions:  


Allergic medical conditions are identified for your symptoms today.  Your vital 

signs exam and lab work-up are reassuring.  Increase your fluids at home, rest. 

Follow-up with your primary doctor should your symptoms persist.  Return to the 

emergency department if your symptoms change in any way emergently concerning to

you.





All discharge instructions reviewed with patient and/or family. Voiced 

understanding.











CHANI AUGUSTINE DO            Aug 29, 2023 13:43